# Patient Record
Sex: FEMALE | Race: WHITE
[De-identification: names, ages, dates, MRNs, and addresses within clinical notes are randomized per-mention and may not be internally consistent; named-entity substitution may affect disease eponyms.]

---

## 2020-02-11 ENCOUNTER — HOSPITAL ENCOUNTER (OUTPATIENT)
Dept: HOSPITAL 76 - DI.N | Age: 40
Discharge: HOME | End: 2020-02-11
Attending: INTERNAL MEDICINE
Payer: COMMERCIAL

## 2020-02-11 DIAGNOSIS — M25.551: Primary | ICD-10-CM

## 2020-02-12 NOTE — XRAY REPORT
Reason:  HIP PAIN

Procedure Date:  02/11/2020   

Accession Number:  [id number] / [id number]                    

Procedure:  XRN - Hip w/Pelvis 2-3V RT CPT Code:  

 

***Final Report***

 

 

FULL RESULT:

 

 

EXAM:

RIGHT HIP RADIOGRAPHY

 

EXAM DATE: 2/11/2020 10:16 AM.

 

CLINICAL HISTORY: HIP PAIN.

 

COMPARISON: None.

 

TECHNIQUE: 2 views.

 

FINDINGS:

Bones: Normal. No fractures or bone lesion.

 

Joints: Normal. No dislocation. The hip joint space is preserved.

 

Soft Tissues: No soft tissue swelling. IUD noted. Incidental pelvic 

phleboliths are present.

IMPRESSION:

1. No fracture.

2. Normal alignment.

3. If symptoms persist, recommend MRI examination.

 

[facility]

## 2021-03-13 ENCOUNTER — HOSPITAL ENCOUNTER (OUTPATIENT)
Age: 41
End: 2021-03-13
Payer: OTHER GOVERNMENT

## 2021-03-13 DIAGNOSIS — Z20.822: ICD-10-CM

## 2021-03-13 DIAGNOSIS — J02.9: Primary | ICD-10-CM

## 2021-03-13 DIAGNOSIS — R30.0: ICD-10-CM

## 2021-03-13 PROCEDURE — 87077 CULTURE AEROBIC IDENTIFY: CPT

## 2021-03-13 PROCEDURE — 87086 URINE CULTURE/COLONY COUNT: CPT

## 2021-03-13 PROCEDURE — 87635 SARS-COV-2 COVID-19 AMP PRB: CPT

## 2021-03-13 PROCEDURE — 87186 SC STD MICRODIL/AGAR DIL: CPT
